# Patient Record
Sex: MALE | Race: WHITE | URBAN - METROPOLITAN AREA
[De-identification: names, ages, dates, MRNs, and addresses within clinical notes are randomized per-mention and may not be internally consistent; named-entity substitution may affect disease eponyms.]

---

## 2020-10-13 ENCOUNTER — OFFICE VISIT (OUTPATIENT)
Dept: FAMILY MEDICINE CLINIC | Facility: CLINIC | Age: 55
End: 2020-10-13
Payer: COMMERCIAL

## 2020-10-13 VITALS
HEIGHT: 70.5 IN | SYSTOLIC BLOOD PRESSURE: 122 MMHG | TEMPERATURE: 97 F | OXYGEN SATURATION: 99 % | WEIGHT: 253 LBS | DIASTOLIC BLOOD PRESSURE: 70 MMHG | BODY MASS INDEX: 35.82 KG/M2 | HEART RATE: 90 BPM

## 2020-10-13 DIAGNOSIS — Z12.5 SCREENING PSA (PROSTATE SPECIFIC ANTIGEN): ICD-10-CM

## 2020-10-13 DIAGNOSIS — K21.9 GASTROESOPHAGEAL REFLUX DISEASE, UNSPECIFIED WHETHER ESOPHAGITIS PRESENT: ICD-10-CM

## 2020-10-13 DIAGNOSIS — Z00.00 WELLNESS EXAMINATION: Primary | ICD-10-CM

## 2020-10-13 DIAGNOSIS — I10 HYPERTENSION, UNSPECIFIED TYPE: ICD-10-CM

## 2020-10-13 PROCEDURE — 3074F SYST BP LT 130 MM HG: CPT | Performed by: NURSE PRACTITIONER

## 2020-10-13 PROCEDURE — 99386 PREV VISIT NEW AGE 40-64: CPT | Performed by: NURSE PRACTITIONER

## 2020-10-13 PROCEDURE — 3078F DIAST BP <80 MM HG: CPT | Performed by: NURSE PRACTITIONER

## 2020-10-13 PROCEDURE — 3008F BODY MASS INDEX DOCD: CPT | Performed by: NURSE PRACTITIONER

## 2020-10-13 RX ORDER — LOSARTAN POTASSIUM 50 MG/1
50 TABLET ORAL DAILY
Qty: 90 TABLET | Refills: 0 | Status: SHIPPED | OUTPATIENT
Start: 2020-10-13 | End: 2021-01-26

## 2020-10-13 RX ORDER — ESOMEPRAZOLE MAGNESIUM 10 MG/1
1 GRANULE, FOR SUSPENSION, EXTENDED RELEASE ORAL DAILY
COMMUNITY

## 2020-10-13 RX ORDER — LOSARTAN POTASSIUM 50 MG/1
50 TABLET ORAL DAILY
COMMUNITY
Start: 2020-09-16 | End: 2020-10-13

## 2020-10-13 NOTE — PROGRESS NOTES
HPI:   Kaelyn Hoskins is a 48year old male who presents for an Annual Health Visit. Patient is here as a new patient, recently moved from [facility]. Is looking to establish care in the area.   States that this time he has no medical concern Psychiatric/Behavioral: Negative.           EXAM:   /70 (BP Location: Right arm, Patient Position: Sitting, Cuff Size: adult)   Pulse 90   Temp 97.2 °F (36.2 °C) (Other)   Ht 70.5\"   Wt 253 lb (114.8 kg)   SpO2 99%   BMI 35.79 kg/m²    Wt Readings fr Screening PSA (prostate specific antigen)  -     CBC WITH DIFFERENTIAL WITH PLATELET  -     COMP METABOLIC PANEL (14)  -     PSA, DIAGNOSTIC  -     TSH W REFLEX TO FREE T4  -     LIPID PANEL    Other orders  -     losartan Potassium 50 MG Oral Tab;  Take 1 · Swap out sugar-filled soda and other drinks. Buy sugar-free or low-calorie beverages. Remember water is always the best choice. · Read labels and choose foods with less added sugar.  Keep in mind that dairy foods and foods with fruit will have some natur

## 2020-10-13 NOTE — PATIENT INSTRUCTIONS
Refill of losartan today     Labs at [facility], please fast for these labs. We will call you with results.      Keep a heart healthy diet     Drink lots of water     Stay active     Follow up in 1 year, sooner if any concerns or questions     Eye exam, dental e flavor with extracts like almond, vanilla, or orange. Or add spices such as cinnamon or nutmeg. Step 4. Eat more fiber  · Eat fresh fruits and vegetables every day. · Boost your diet with whole grains. Go for oats, whole-grain rice, and bran.   · Add be

## 2020-10-19 ENCOUNTER — TELEPHONE (OUTPATIENT)
Dept: FAMILY MEDICINE CLINIC | Facility: CLINIC | Age: 55
End: 2020-10-19

## 2020-10-19 NOTE — TELEPHONE ENCOUNTER
----- Message from FRANK Meyer sent at 10/19/2020  8:23 AM CDT -----  Results reviewed. Please let Chris Gamez know that his hemoglobin A1c is 5.8. This is considered prediabetic. In order to avoid diabetes he must begin to make dietary changes.   I r

## 2021-01-25 NOTE — TELEPHONE ENCOUNTER
Future appt:    Last Appointment with provider:   10/13/2020 Physical, return in 1 year  Last appointment at [facility]:  10/13/2020  CHOLESTEROL, TOTAL (mg/dL)   Date Value   10/14/2020 178     HDL CHOLESTEROL (mg/dL)   Date Value   10/14/2020 44     LD

## 2021-01-26 RX ORDER — LOSARTAN POTASSIUM 50 MG/1
TABLET ORAL
Qty: 90 TABLET | Refills: 3 | Status: SHIPPED | OUTPATIENT
Start: 2021-01-26 | End: 2022-01-07

## 2021-11-03 ENCOUNTER — OFFICE VISIT (OUTPATIENT)
Dept: FAMILY MEDICINE CLINIC | Facility: CLINIC | Age: 56
End: 2021-11-03
Payer: COMMERCIAL

## 2021-11-03 VITALS
OXYGEN SATURATION: 99 % | HEART RATE: 106 BPM | DIASTOLIC BLOOD PRESSURE: 96 MMHG | TEMPERATURE: 99 F | SYSTOLIC BLOOD PRESSURE: 136 MMHG

## 2021-11-03 DIAGNOSIS — J04.0 LARYNGITIS, ACUTE: Primary | ICD-10-CM

## 2021-11-03 PROCEDURE — 99213 OFFICE O/P EST LOW 20 MIN: CPT | Performed by: NURSE PRACTITIONER

## 2021-11-03 PROCEDURE — 3080F DIAST BP >= 90 MM HG: CPT | Performed by: NURSE PRACTITIONER

## 2021-11-03 PROCEDURE — 3075F SYST BP GE 130 - 139MM HG: CPT | Performed by: NURSE PRACTITIONER

## 2021-11-03 NOTE — PROGRESS NOTES
HPI:    Patient ID: Karan Mclaughlin is a 55year old male. HPI     Respiratory Clinic    States that he has lots of congestion that started 2.5 weeks ago. Lost voice about 3 - 4 days ago. Has a tickle in his throat.  Feels like congestion  No medica Heart sounds: Normal heart sounds. No murmur heard. No friction rub. Pulmonary:      Effort: Pulmonary effort is normal. No respiratory distress. Breath sounds: Normal breath sounds. No wheezing or rales.    Musculoskeletal:         General: No

## 2021-11-03 NOTE — PATIENT INSTRUCTIONS
Take acetaminophen or ibuprofen for fever/discomfort  Drink plenty of fluids, warm liquids  Antihistamines for congestion  Expectorant and/or cough suppressant  Cough drops as needed.    Use saline drops as needed or use neti pot/rinse (with distilled water

## 2022-01-07 RX ORDER — LOSARTAN POTASSIUM 50 MG/1
50 TABLET ORAL DAILY
Qty: 90 TABLET | Refills: 0 | Status: SHIPPED | OUTPATIENT
Start: 2022-01-07 | End: 2022-01-11

## 2022-01-07 NOTE — TELEPHONE ENCOUNTER
Appointment scheduled - please call into [facility]     Your appointments     Date & Time Appointment Department [facility])    Jan 26, 2022  8:00 AM CST Adult Physical with Edd Perdomo, [address], [address] [place] [facility]

## 2022-01-08 ENCOUNTER — TELEPHONE (OUTPATIENT)
Dept: FAMILY MEDICINE CLINIC | Facility: CLINIC | Age: 57
End: 2022-01-08

## 2022-01-08 NOTE — TELEPHONE ENCOUNTER
[facility] informed patient Losartan cannot be filled. [employer] shortage. Please advise change in Losartan.   Jazmine Wright CMA, 01/08/22, 9:48 AM

## 2022-01-08 NOTE — TELEPHONE ENCOUNTER
Pt sent my chart message stating [facility] is unable to refill script for losartan.     Future Appointments   Date Time Provider Elma Interiano   1/26/2022  8:00 AM Rianna Nettles, [facility] [place]

## 2022-01-11 RX ORDER — IRBESARTAN 150 MG/1
150 TABLET ORAL DAILY
Qty: 90 TABLET | Refills: 1 | Status: SHIPPED | OUTPATIENT
Start: 2022-01-11 | End: 2022-01-12

## 2022-01-11 NOTE — TELEPHONE ENCOUNTER
Patient has read my chart message regarding   Irbesartan instructions.   Gerald Cano CMA, 01/11/22, 1:26 PM

## 2022-01-11 NOTE — TELEPHONE ENCOUNTER
Please finish current prescription for losartan, then switch to irbesartan 150 mg p.o. daily. Check blood pressure at home 2 or 3 times a week for the first several weeks to make sure that this is working well. Call if any side effects or problems.

## 2022-01-12 ENCOUNTER — PATIENT MESSAGE (OUTPATIENT)
Dept: FAMILY MEDICINE CLINIC | Facility: CLINIC | Age: 57
End: 2022-01-12

## 2022-01-12 RX ORDER — IRBESARTAN 150 MG/1
150 TABLET ORAL DAILY
Qty: 15 TABLET | Refills: 1 | Status: SHIPPED | OUTPATIENT
Start: 2022-01-12

## 2022-01-12 NOTE — TELEPHONE ENCOUNTER
From: Gera Bright  To: Jon Rajan  Sent: 1/11/2022 11:19 AM CST  Subject: Blood Pressure Medication    From: Josué Mcgill MD    10:42 AM    Please finish current prescription for losartan, then switch to irbesartan 150 mg p.o. daily.      Ch

## 2022-01-12 NOTE — TELEPHONE ENCOUNTER
Needing short term supply of Irebesartan to  [facility] until mail order arrives.   Merna Kingston CMA, 01/12/22, 1:14 PM

## 2022-01-29 ENCOUNTER — PATIENT MESSAGE (OUTPATIENT)
Dept: FAMILY MEDICINE CLINIC | Facility: CLINIC | Age: 57
End: 2022-01-29

## 2022-01-31 NOTE — TELEPHONE ENCOUNTER
From: Soni Palomares  To: FRANK Anna  Sent: 1/29/2022 8:35 AM CST  Subject: Covid    Can someone please call me I have questions regarding covid symptoms.  [address] [facility][note 3]

## 2022-01-31 NOTE — TELEPHONE ENCOUNTER
Spoke with patient. He states he does not have any questions at this time. He had questions about his wife but they are at the ER now.

## 2022-02-04 RX ORDER — IRBESARTAN 150 MG/1
TABLET ORAL
Qty: 15 TABLET | Refills: 1 | OUTPATIENT
Start: 2022-02-04

## 2022-02-04 NOTE — TELEPHONE ENCOUNTER
Irbesartan: 1/1222    Patient overdue Physical.  Needs to schedule with . Should have received Mail Order Irbesartan.   Joe Arana CMA, 02/04/22, 8:14 AM

## 2022-02-17 ENCOUNTER — OFFICE VISIT (OUTPATIENT)
Dept: FAMILY MEDICINE CLINIC | Facility: CLINIC | Age: 57
End: 2022-02-17
Payer: COMMERCIAL

## 2022-02-17 VITALS
HEART RATE: 88 BPM | RESPIRATION RATE: 18 BRPM | DIASTOLIC BLOOD PRESSURE: 86 MMHG | TEMPERATURE: 98 F | HEIGHT: 71 IN | BODY MASS INDEX: 38.5 KG/M2 | OXYGEN SATURATION: 98 % | SYSTOLIC BLOOD PRESSURE: 132 MMHG | WEIGHT: 275 LBS

## 2022-02-17 DIAGNOSIS — R73.09 ELEVATED GLUCOSE: ICD-10-CM

## 2022-02-17 DIAGNOSIS — Z13.29 THYROID DISORDER SCREEN: ICD-10-CM

## 2022-02-17 DIAGNOSIS — Z13.6 SCREENING FOR CARDIOVASCULAR CONDITION: ICD-10-CM

## 2022-02-17 DIAGNOSIS — Z13.220 LIPID SCREENING: ICD-10-CM

## 2022-02-17 DIAGNOSIS — Z00.00 WELLNESS EXAMINATION: Primary | ICD-10-CM

## 2022-02-17 DIAGNOSIS — Z12.5 PROSTATE CANCER SCREENING: ICD-10-CM

## 2022-02-17 DIAGNOSIS — Z13.0 SCREENING FOR DEFICIENCY ANEMIA: ICD-10-CM

## 2022-02-17 PROBLEM — J32.9 SINUSITIS: Status: ACTIVE | Noted: 2022-02-17

## 2022-02-17 PROBLEM — K92.1 FLECKS OF BLOOD IN STOOL: Status: ACTIVE | Noted: 2022-02-17

## 2022-02-17 PROBLEM — I87.309 VENOUS HYPERTENSION: Status: ACTIVE | Noted: 2018-10-08

## 2022-02-17 PROBLEM — N52.9 IMPOTENCE OF ORGANIC ORIGIN: Status: ACTIVE | Noted: 2022-02-17

## 2022-02-17 PROBLEM — R05.9 COUGH: Status: ACTIVE | Noted: 2022-02-17

## 2022-02-17 PROBLEM — M79.604 PAIN OF RIGHT LOWER EXTREMITY: Status: ACTIVE | Noted: 2018-10-08

## 2022-02-17 PROBLEM — N20.9 UROLITH: Status: ACTIVE | Noted: 2022-02-17

## 2022-02-17 PROBLEM — R10.9 ABDOMINAL PAIN: Status: ACTIVE | Noted: 2022-02-17

## 2022-02-17 PROBLEM — I83.10 VARICOSE VEINS OF LOWER EXTREMITY WITH INFLAMMATION: Status: ACTIVE | Noted: 2018-10-08

## 2022-02-17 PROBLEM — M79.89 SWELLING OF LOWER LEG: Status: ACTIVE | Noted: 2018-10-08

## 2022-02-17 PROBLEM — R06.00 DYSPNEA: Status: ACTIVE | Noted: 2022-02-17

## 2022-02-17 PROBLEM — R11.0 NAUSEA: Status: ACTIVE | Noted: 2022-02-17

## 2022-02-17 PROBLEM — R03.0 ELEVATED BLOOD-PRESSURE READING WITHOUT DIAGNOSIS OF HYPERTENSION: Status: ACTIVE | Noted: 2022-02-17

## 2022-02-17 PROBLEM — E66.9 OBESITY: Status: ACTIVE | Noted: 2018-10-08

## 2022-02-17 PROBLEM — M79.605 PAIN OF LEFT LOWER EXTREMITY: Status: ACTIVE | Noted: 2018-10-08

## 2022-02-17 PROBLEM — R07.9 CHEST PAIN: Status: ACTIVE | Noted: 2022-02-17

## 2022-02-17 PROBLEM — J32.9 SINUSITIS: Status: RESOLVED | Noted: 2022-02-17 | Resolved: 2022-02-17

## 2022-02-17 PROBLEM — R35.1 NOCTURIA: Status: ACTIVE | Noted: 2022-02-17

## 2022-02-17 PROBLEM — I83.899 HEMORRHAGE OF VARICOSE VEINS OF LOWER EXTREMITY: Status: ACTIVE | Noted: 2018-10-08

## 2022-02-17 PROBLEM — J20.9 ACUTE BRONCHITIS: Status: ACTIVE | Noted: 2022-02-17

## 2022-02-17 PROBLEM — R63.5 ABNORMAL WEIGHT GAIN: Status: ACTIVE | Noted: 2022-02-17

## 2022-02-17 PROBLEM — J20.9 ACUTE BRONCHITIS: Status: RESOLVED | Noted: 2022-02-17 | Resolved: 2022-02-17

## 2022-02-17 PROCEDURE — 90472 IMMUNIZATION ADMIN EACH ADD: CPT | Performed by: NURSE PRACTITIONER

## 2022-02-17 PROCEDURE — 90715 TDAP VACCINE 7 YRS/> IM: CPT | Performed by: NURSE PRACTITIONER

## 2022-02-17 PROCEDURE — 99396 PREV VISIT EST AGE 40-64: CPT | Performed by: NURSE PRACTITIONER

## 2022-02-17 PROCEDURE — 3008F BODY MASS INDEX DOCD: CPT | Performed by: NURSE PRACTITIONER

## 2022-02-17 PROCEDURE — 90471 IMMUNIZATION ADMIN: CPT | Performed by: NURSE PRACTITIONER

## 2022-02-17 PROCEDURE — 3075F SYST BP GE 130 - 139MM HG: CPT | Performed by: NURSE PRACTITIONER

## 2022-02-17 PROCEDURE — 90750 HZV VACC RECOMBINANT IM: CPT | Performed by: NURSE PRACTITIONER

## 2022-02-17 PROCEDURE — 3079F DIAST BP 80-89 MM HG: CPT | Performed by: NURSE PRACTITIONER

## 2022-02-17 PROCEDURE — 93000 ELECTROCARDIOGRAM COMPLETE: CPT | Performed by: NURSE PRACTITIONER

## 2022-02-17 RX ORDER — IRBESARTAN 150 MG/1
150 TABLET ORAL DAILY
Qty: 90 TABLET | Refills: 3 | Status: SHIPPED | OUTPATIENT
Start: 2022-02-17 | End: 2022-03-16

## 2022-02-17 RX ORDER — OMEPRAZOLE 10 MG/1
10 CAPSULE, DELAYED RELEASE ORAL DAILY
Qty: 90 CAPSULE | Refills: 3 | Status: SHIPPED | OUTPATIENT
Start: 2022-02-17 | End: 2022-03-16

## 2022-02-17 NOTE — PATIENT INSTRUCTIONS
EKG: normal     Go to [facility] for labs. Refills done. Do Cologuard. Shingrix #1 and Tdap today. Shingrix #2 in 2 - 6 months. Otherwise follow up annually and as needed.

## 2022-02-19 LAB
ABSOLUTE BASOPHILS: 37 CELLS/UL (ref 0–200)
ABSOLUTE EOSINOPHILS: 133 CELLS/UL (ref 15–500)
ABSOLUTE LYMPHOCYTES: 910 CELLS/UL (ref 850–3900)
ABSOLUTE MONOCYTES: 466 CELLS/UL (ref 200–950)
ABSOLUTE NEUTROPHILS: 5853 CELLS/UL (ref 1500–7800)
ALBUMIN/GLOBULIN RATIO: 1.4 (CALC) (ref 1–2.5)
ALBUMIN: 4.2 G/DL (ref 3.6–5.1)
ALKALINE PHOSPHATASE: 73 U/L (ref 35–144)
ALT: 21 U/L (ref 9–46)
AST: 15 U/L (ref 10–35)
BASOPHILS: 0.5 %
BILIRUBIN, TOTAL: 2.1 MG/DL (ref 0.2–1.2)
BUN: 14 MG/DL (ref 7–25)
CALCIUM: 9.2 MG/DL (ref 8.6–10.3)
CARBON DIOXIDE: 26 MMOL/L (ref 20–32)
CHLORIDE: 105 MMOL/L (ref 98–110)
CHOL/HDLC RATIO: 5.8 (CALC)
CHOLESTEROL, TOTAL: 204 MG/DL
CREATININE: 1.01 MG/DL (ref 0.7–1.33)
EGFR IF AFRICN AM: 97 ML/MIN/1.73M2
EGFR IF NONAFRICN AM: 83 ML/MIN/1.73M2
EOSINOPHILS: 1.8 %
GLOBULIN: 2.9 G/DL (CALC) (ref 1.9–3.7)
GLUCOSE: 82 MG/DL (ref 65–99)
HDL CHOLESTEROL: 35 MG/DL
HEMATOCRIT: 42.9 % (ref 38.5–50)
HEMOGLOBIN A1C: 6.4 % OF TOTAL HGB
HEMOGLOBIN: 14.5 G/DL (ref 13.2–17.1)
LDL-CHOLESTEROL: 139 MG/DL (CALC)
LYMPHOCYTES: 12.3 %
MCH: 29 PG (ref 27–33)
MCHC: 33.8 G/DL (ref 32–36)
MONOCYTES: 6.3 %
MPV: 11.7 FL (ref 7.5–12.5)
NEUTROPHILS: 79.1 %
NON-HDL CHOLESTEROL: 169 MG/DL (CALC)
PLATELET COUNT: 253 THOUSAND/UL (ref 140–400)
POTASSIUM: 4.1 MMOL/L (ref 3.5–5.3)
PROTEIN, TOTAL: 7.1 G/DL (ref 6.1–8.1)
PSA, TOTAL: 1.07 NG/ML
RDW: 12.4 % (ref 11–15)
RED BLOOD CELL COUNT: 5 MILLION/UL (ref 4.2–5.8)
SODIUM: 140 MMOL/L (ref 135–146)
TRIGLYCERIDES: 164 MG/DL
TSH W/REFLEX TO FT4: 1.45 MIU/L (ref 0.4–4.5)
WHITE BLOOD CELL COUNT: 7.4 THOUSAND/UL (ref 3.8–10.8)

## 2022-02-21 ENCOUNTER — TELEPHONE (OUTPATIENT)
Dept: FAMILY MEDICINE CLINIC | Facility: CLINIC | Age: 57
End: 2022-02-21

## 2022-03-16 ENCOUNTER — PATIENT MESSAGE (OUTPATIENT)
Dept: FAMILY MEDICINE CLINIC | Facility: CLINIC | Age: 57
End: 2022-03-16

## 2022-03-16 RX ORDER — IRBESARTAN 150 MG/1
150 TABLET ORAL DAILY
Qty: 90 TABLET | Refills: 3 | Status: SHIPPED | OUTPATIENT
Start: 2022-03-16

## 2022-03-16 RX ORDER — OMEPRAZOLE 10 MG/1
10 CAPSULE, DELAYED RELEASE ORAL DAILY
Qty: 90 CAPSULE | Refills: 3 | Status: SHIPPED | OUTPATIENT
Start: 2022-03-16

## 2022-03-16 NOTE — TELEPHONE ENCOUNTER
From: Mortimer Crock  To: 78 FRANK Thrasher  Sent: 3/16/2022 6:29 AM CDT  Subject: Refill prescriptions    Good Morning,  [facility] [address] [facility]. [facility].     Thanks  [employer]

## 2022-05-24 DIAGNOSIS — Z00.00 WELLNESS EXAMINATION: ICD-10-CM

## 2022-05-24 RX ORDER — IRBESARTAN 150 MG/1
150 TABLET ORAL DAILY
Qty: 90 TABLET | Refills: 3 | OUTPATIENT
Start: 2022-05-24

## 2022-05-26 ENCOUNTER — NURSE ONLY (OUTPATIENT)
Dept: FAMILY MEDICINE CLINIC | Facility: CLINIC | Age: 57
End: 2022-05-26
Payer: COMMERCIAL

## 2022-05-26 VITALS — TEMPERATURE: 99 F

## 2022-05-26 DIAGNOSIS — Z23 NEED FOR VACCINATION: ICD-10-CM

## 2022-05-26 PROCEDURE — 90750 HZV VACC RECOMBINANT IM: CPT | Performed by: NURSE PRACTITIONER

## 2022-05-26 NOTE — PROGRESS NOTES
Patient presents for second shingles vaccine as ordered by [employer]. Patient tolerated injection to left deltoid. Left office in stable condition.

## 2023-02-16 DIAGNOSIS — Z00.00 WELLNESS EXAMINATION: ICD-10-CM

## 2023-02-24 RX ORDER — IRBESARTAN 150 MG/1
TABLET ORAL
Qty: 90 TABLET | Refills: 3 | OUTPATIENT
Start: 2023-02-24

## 2023-03-09 ENCOUNTER — OFFICE VISIT (OUTPATIENT)
Dept: FAMILY MEDICINE CLINIC | Facility: CLINIC | Age: 58
End: 2023-03-09
Payer: COMMERCIAL

## 2023-03-09 VITALS
RESPIRATION RATE: 18 BRPM | BODY MASS INDEX: 39.55 KG/M2 | HEART RATE: 97 BPM | WEIGHT: 285.63 LBS | OXYGEN SATURATION: 98 % | HEIGHT: 71.25 IN | DIASTOLIC BLOOD PRESSURE: 72 MMHG | SYSTOLIC BLOOD PRESSURE: 126 MMHG | TEMPERATURE: 98 F

## 2023-03-09 DIAGNOSIS — Z13.29 THYROID DISORDER SCREEN: ICD-10-CM

## 2023-03-09 DIAGNOSIS — Z13.0 SCREENING FOR DEFICIENCY ANEMIA: ICD-10-CM

## 2023-03-09 DIAGNOSIS — Z13.220 LIPID SCREENING: ICD-10-CM

## 2023-03-09 DIAGNOSIS — I10 BENIGN ESSENTIAL HYPERTENSION: ICD-10-CM

## 2023-03-09 DIAGNOSIS — R73.09 ELEVATED GLUCOSE: ICD-10-CM

## 2023-03-09 DIAGNOSIS — K92.1 BLOOD IN STOOL: ICD-10-CM

## 2023-03-09 DIAGNOSIS — Z00.00 WELLNESS EXAMINATION: Primary | ICD-10-CM

## 2023-03-09 PROBLEM — M79.604 PAIN OF RIGHT LOWER EXTREMITY: Status: RESOLVED | Noted: 2018-10-08 | Resolved: 2023-03-09

## 2023-03-09 PROBLEM — R10.9 ABDOMINAL PAIN: Status: RESOLVED | Noted: 2022-02-17 | Resolved: 2023-03-09

## 2023-03-09 PROBLEM — R11.0 NAUSEA: Status: RESOLVED | Noted: 2022-02-17 | Resolved: 2023-03-09

## 2023-03-09 PROBLEM — R63.5 ABNORMAL WEIGHT GAIN: Status: RESOLVED | Noted: 2022-02-17 | Resolved: 2023-03-09

## 2023-03-09 PROBLEM — R06.00 DYSPNEA: Status: RESOLVED | Noted: 2022-02-17 | Resolved: 2023-03-09

## 2023-03-09 PROBLEM — I83.90 VARICOSE VEINS: Status: ACTIVE | Noted: 2018-10-08

## 2023-03-09 PROBLEM — M79.89 SWELLING OF LOWER LEG: Status: RESOLVED | Noted: 2018-10-08 | Resolved: 2023-03-09

## 2023-03-09 PROBLEM — R05.9 COUGH: Status: RESOLVED | Noted: 2022-02-17 | Resolved: 2023-03-09

## 2023-03-09 PROBLEM — R07.9 CHEST PAIN: Status: RESOLVED | Noted: 2022-02-17 | Resolved: 2023-03-09

## 2023-03-09 LAB
OCCULT BLOOD, STOOL 1: POSITIVE
PERFORMANCE MONITORS CORRECT (YES/NO): YES YES/NO

## 2023-03-09 PROCEDURE — 99396 PREV VISIT EST AGE 40-64: CPT | Performed by: NURSE PRACTITIONER

## 2023-03-09 PROCEDURE — 3074F SYST BP LT 130 MM HG: CPT | Performed by: NURSE PRACTITIONER

## 2023-03-09 PROCEDURE — 3078F DIAST BP <80 MM HG: CPT | Performed by: NURSE PRACTITIONER

## 2023-03-09 PROCEDURE — 82272 OCCULT BLD FECES 1-3 TESTS: CPT | Performed by: NURSE PRACTITIONER

## 2023-03-09 PROCEDURE — 3008F BODY MASS INDEX DOCD: CPT | Performed by: NURSE PRACTITIONER

## 2023-03-09 RX ORDER — IRBESARTAN 150 MG/1
150 TABLET ORAL DAILY
Qty: 90 TABLET | Refills: 3 | Status: SHIPPED | OUTPATIENT
Start: 2023-03-09

## 2023-03-09 NOTE — PATIENT INSTRUCTIONS
[facility] received insurance authorization for short inpt rehab stay. Met with pt and wife this pm. Wife will transport via car. Faxed discharge instructions/summary to facility @ [phone]. Rn to call report to [phone]. Information to accompany pt:  Discharge instructions/summary/MAR/updated kardex/ and signed Em[facility] form.   (inpt rehab)  Lennox Greig, LCSW Get labs at [facility].     Schedule sleep consult. Referral to Dr. Nicolas Dunbar. Follow up based on test results.

## 2023-03-12 LAB
ABSOLUTE BASOPHILS: 40 CELLS/UL (ref 0–200)
ABSOLUTE EOSINOPHILS: 80 CELLS/UL (ref 15–500)
ABSOLUTE LYMPHOCYTES: 1048 CELLS/UL (ref 850–3900)
ABSOLUTE MONOCYTES: 360 CELLS/UL (ref 200–950)
ABSOLUTE NEUTROPHILS: 6472 CELLS/UL (ref 1500–7800)
ALBUMIN/GLOBULIN RATIO: 1.6 (CALC) (ref 1–2.5)
ALBUMIN: 4.2 G/DL (ref 3.6–5.1)
ALKALINE PHOSPHATASE: 86 U/L (ref 35–144)
ALT: 19 U/L (ref 9–46)
AST: 15 U/L (ref 10–35)
BASOPHILS: 0.5 %
BILIRUBIN, TOTAL: 1.4 MG/DL (ref 0.2–1.2)
BUN: 13 MG/DL (ref 7–25)
CALCIUM: 9.1 MG/DL (ref 8.6–10.3)
CARBON DIOXIDE: 25 MMOL/L (ref 20–32)
CHLORIDE: 103 MMOL/L (ref 98–110)
CHOL/HDLC RATIO: 5.7 (CALC)
CHOLESTEROL, TOTAL: 189 MG/DL
CREATININE: 0.94 MG/DL (ref 0.7–1.3)
EGFR: 95 ML/MIN/1.73M2
EOSINOPHILS: 1 %
GLOBULIN: 2.6 G/DL (CALC) (ref 1.9–3.7)
GLUCOSE: 134 MG/DL (ref 65–99)
HDL CHOLESTEROL: 33 MG/DL
HEMATOCRIT: 46.1 % (ref 38.5–50)
HEMOGLOBIN A1C: 7.5 % OF TOTAL HGB
HEMOGLOBIN: 15.5 G/DL (ref 13.2–17.1)
LDL-CHOLESTEROL: 127 MG/DL (CALC)
LYMPHOCYTES: 13.1 %
MCH: 28.6 PG (ref 27–33)
MCHC: 33.6 G/DL (ref 32–36)
MCV: 85.1 FL (ref 80–100)
MONOCYTES: 4.5 %
MPV: 12.1 FL (ref 7.5–12.5)
NEUTROPHILS: 80.9 %
NON-HDL CHOLESTEROL: 156 MG/DL (CALC)
PLATELET COUNT: 271 THOUSAND/UL (ref 140–400)
POTASSIUM: 4.4 MMOL/L (ref 3.5–5.3)
PROTEIN, TOTAL: 6.8 G/DL (ref 6.1–8.1)
RDW: 12.6 % (ref 11–15)
RED BLOOD CELL COUNT: 5.42 MILLION/UL (ref 4.2–5.8)
SODIUM: 138 MMOL/L (ref 135–146)
TRIGLYCERIDES: 175 MG/DL
TSH W/REFLEX TO FT4: 1.18 MIU/L (ref 0.4–4.5)
WHITE BLOOD CELL COUNT: 8 THOUSAND/UL (ref 3.8–10.8)

## 2023-04-24 ENCOUNTER — PATIENT MESSAGE (OUTPATIENT)
Dept: FAMILY MEDICINE CLINIC | Facility: CLINIC | Age: 58
End: 2023-04-24

## 2023-04-24 NOTE — TELEPHONE ENCOUNTER
From: Andreina Campbell  To: FRANK King  Sent: 4/24/2023 8:40 AM CDT  Subject: Coughing up blood     Hi Cata,  I have been coughing up blood for a couple days now and I was wondering if I need to go to the er? It seems to happen when I have a deep cough then lasts for like 10 minutes and goes away. It happens a couple times a day and I have no other symptoms I feel fine so I'm not sure what to do?

## 2023-04-25 ENCOUNTER — OFFICE VISIT (OUTPATIENT)
Dept: FAMILY MEDICINE CLINIC | Facility: CLINIC | Age: 58
End: 2023-04-25
Payer: COMMERCIAL

## 2023-04-25 VITALS
OXYGEN SATURATION: 97 % | SYSTOLIC BLOOD PRESSURE: 160 MMHG | RESPIRATION RATE: 18 BRPM | DIASTOLIC BLOOD PRESSURE: 112 MMHG | TEMPERATURE: 97 F | WEIGHT: 276 LBS | HEIGHT: 71 IN | BODY MASS INDEX: 38.64 KG/M2 | HEART RATE: 89 BPM

## 2023-04-25 DIAGNOSIS — I10 BENIGN ESSENTIAL HYPERTENSION: Primary | ICD-10-CM

## 2023-04-25 DIAGNOSIS — J18.9 PNEUMONITIS: ICD-10-CM

## 2023-04-25 PROCEDURE — 99214 OFFICE O/P EST MOD 30 MIN: CPT | Performed by: NURSE PRACTITIONER

## 2023-04-25 PROCEDURE — 3080F DIAST BP >= 90 MM HG: CPT | Performed by: NURSE PRACTITIONER

## 2023-04-25 PROCEDURE — 3008F BODY MASS INDEX DOCD: CPT | Performed by: NURSE PRACTITIONER

## 2023-04-25 PROCEDURE — 3077F SYST BP >= 140 MM HG: CPT | Performed by: NURSE PRACTITIONER

## 2023-04-25 RX ORDER — AZITHROMYCIN 250 MG/1
TABLET, FILM COATED ORAL
COMMUNITY
Start: 2023-04-24

## 2023-04-25 RX ORDER — IRBESARTAN 300 MG/1
300 TABLET ORAL NIGHTLY
Qty: 90 TABLET | Refills: 0 | Status: SHIPPED | OUTPATIENT
Start: 2023-04-25

## 2023-04-25 RX ORDER — HYDROCHLOROTHIAZIDE 25 MG/1
25 TABLET ORAL DAILY
Qty: 90 TABLET | Refills: 3 | Status: SHIPPED | OUTPATIENT
Start: 2023-04-25

## 2023-04-25 RX ORDER — AMOXICILLIN AND CLAVULANATE POTASSIUM 875; 125 MG/1; MG/1
TABLET, FILM COATED ORAL
COMMUNITY
Start: 2023-04-24

## 2023-04-25 NOTE — PATIENT INSTRUCTIONS
Continue antibiotics as prescribed. If still has cough after completion, consider inhaled steroids. Increase irbesartan to 300 mg daily. Add hydrochlorothiazide 25 mg daily. Check blood pressure at home. Recheck in one week - sooner if needed.

## 2023-05-03 ENCOUNTER — OFFICE VISIT (OUTPATIENT)
Dept: FAMILY MEDICINE CLINIC | Facility: CLINIC | Age: 58
End: 2023-05-03
Payer: COMMERCIAL

## 2023-05-03 VITALS
BODY MASS INDEX: 38.75 KG/M2 | OXYGEN SATURATION: 95 % | WEIGHT: 276.81 LBS | RESPIRATION RATE: 18 BRPM | HEART RATE: 95 BPM | DIASTOLIC BLOOD PRESSURE: 82 MMHG | HEIGHT: 71 IN | SYSTOLIC BLOOD PRESSURE: 140 MMHG | TEMPERATURE: 98 F

## 2023-05-03 DIAGNOSIS — I10 BENIGN ESSENTIAL HYPERTENSION: Primary | ICD-10-CM

## 2023-05-03 PROCEDURE — 99213 OFFICE O/P EST LOW 20 MIN: CPT | Performed by: NURSE PRACTITIONER

## 2023-05-03 PROCEDURE — 3079F DIAST BP 80-89 MM HG: CPT | Performed by: NURSE PRACTITIONER

## 2023-05-03 PROCEDURE — 3008F BODY MASS INDEX DOCD: CPT | Performed by: NURSE PRACTITIONER

## 2023-05-03 PROCEDURE — 3077F SYST BP >= 140 MM HG: CPT | Performed by: NURSE PRACTITIONER

## 2023-05-03 RX ORDER — METOPROLOL SUCCINATE 50 MG/1
50 TABLET, EXTENDED RELEASE ORAL DAILY
Qty: 90 TABLET | Refills: 0 | Status: SHIPPED | OUTPATIENT
Start: 2023-05-03

## 2023-05-03 NOTE — PATIENT INSTRUCTIONS
Stop irbesartan. Start metoprolol daily    Continue the hydrochlorothiazide    Monitor blood pressures. Call in one week with readings. Otherwise follow up in 6 months - sooner if needed.

## 2023-05-17 ENCOUNTER — PATIENT MESSAGE (OUTPATIENT)
Dept: FAMILY MEDICINE CLINIC | Facility: CLINIC | Age: 58
End: 2023-05-17

## 2023-05-17 NOTE — TELEPHONE ENCOUNTER
Please see pt [facility], asking for referral to different GI to get a colonoscopy sooner than 8/3/23.

## 2023-05-17 NOTE — TELEPHONE ENCOUNTER
Please ask patient if he has someone in mind? We can schedule with Dr. Daniella Lin but I don't know his availability. He can ask around and then give us a call. The general surgery providers at [facility], can do colonoscopy's.  Where would he like his referral?

## 2023-05-17 NOTE — TELEPHONE ENCOUNTER
From: Elier Love  To: Ottoniel Montes, APRN  Sent: 5/17/2023 10:23 AM CDT  Subject: Colonoscopy     Hi Cata,  I have a colonoscopy scheduled for August 3rd, I was wondering if there was a different doctor or number I could call to have this done sooner? The appointment is with Ailyn Montano at [facility].  Thanks [employer]

## 2023-06-21 NOTE — TELEPHONE ENCOUNTER
Losartan: 1/26/21    Future appt:     Your appointments     Date & Time Appointment Department [facility])    Jan 26, 2022  8:00 AM CST Adult Physical with Stormy Valladares, [address], [address] ([place])    P Patient requests all Lab, Cardiology, and Radiology Results on their Discharge Instructions

## 2023-07-31 RX ORDER — METOPROLOL SUCCINATE 50 MG/1
50 TABLET, EXTENDED RELEASE ORAL DAILY
Qty: 90 TABLET | Refills: 0 | Status: SHIPPED | OUTPATIENT
Start: 2023-07-31

## 2023-07-31 NOTE — TELEPHONE ENCOUNTER
Last Refill: 05/03/2023 50MG #90 with 0 Refills  Last Px: 3/09/2023      Return in 6 months (on 9/9/2023). Future appt:    Last Appointment with provider:   5/3/2023  [facility] appointment [facility]:  5/3/2023  CHOLESTEROL, TOTAL (mg/dL)   Date Value   03/11/2023 189     HDL CHOLESTEROL (mg/dL)   Date Value   03/11/2023 33 (L)     LDL-CHOLESTEROL (mg/dL (calc))   Date Value   03/11/2023 127 (H)     TRIGLYCERIDES (mg/dL)   Date Value   03/11/2023 175 (H)     Lab Results   Component Value Date    A1C 7.5 (H) 03/11/2023     Lab Results   Component Value Date    TSHT4 1.18 03/11/2023       No follow-ups on file.

## (undated) DIAGNOSIS — Z00.00 WELLNESS EXAMINATION: ICD-10-CM